# Patient Record
Sex: FEMALE | Race: WHITE | HISPANIC OR LATINO | Employment: PART TIME | URBAN - METROPOLITAN AREA
[De-identification: names, ages, dates, MRNs, and addresses within clinical notes are randomized per-mention and may not be internally consistent; named-entity substitution may affect disease eponyms.]

---

## 2017-08-11 ENCOUNTER — HOSPITAL ENCOUNTER (OUTPATIENT)
Dept: RADIOLOGY | Facility: HOSPITAL | Age: 47
Discharge: HOME | End: 2017-08-11
Payer: MEDICARE

## 2017-08-11 ENCOUNTER — TRANSCRIBE ORDERS (OUTPATIENT)
Dept: ADMINISTRATIVE | Facility: HOSPITAL | Age: 47
End: 2017-08-11

## 2017-08-11 ENCOUNTER — APPOINTMENT (OUTPATIENT)
Dept: LAB | Facility: HOSPITAL | Age: 47
End: 2017-08-11
Payer: MEDICARE

## 2017-08-11 ENCOUNTER — OFFICE VISIT (OUTPATIENT)
Dept: LAB | Facility: HOSPITAL | Age: 47
End: 2017-08-11
Payer: MEDICARE

## 2017-08-11 DIAGNOSIS — Z01.812 PRE-OPERATIVE LABORATORY EXAMINATION: Primary | ICD-10-CM

## 2017-08-11 DIAGNOSIS — Z01.812 PRE-OPERATIVE LABORATORY EXAMINATION: ICD-10-CM

## 2017-08-11 DIAGNOSIS — Z01.818 PREOP EXAMINATION: ICD-10-CM

## 2017-08-11 LAB
ALBUMIN SERPL BCP-MCNC: 3.9 G/DL (ref 3.5–5)
ALP SERPL-CCNC: 95 U/L (ref 46–116)
ALT SERPL W P-5'-P-CCNC: 42 U/L (ref 12–78)
ANION GAP SERPL CALCULATED.3IONS-SCNC: 10 MMOL/L (ref 4–13)
AST SERPL W P-5'-P-CCNC: 26 U/L (ref 5–45)
ATRIAL RATE: 66 BPM
BASOPHILS # BLD AUTO: 0.02 THOUSANDS/ΜL (ref 0–0.1)
BASOPHILS NFR BLD AUTO: 0 % (ref 0–1)
BILIRUB SERPL-MCNC: 0.5 MG/DL (ref 0.2–1)
BUN SERPL-MCNC: 10 MG/DL (ref 5–25)
CALCIUM SERPL-MCNC: 9.2 MG/DL (ref 8.3–10.1)
CHLORIDE SERPL-SCNC: 104 MMOL/L (ref 100–108)
CO2 SERPL-SCNC: 27 MMOL/L (ref 21–32)
CREAT SERPL-MCNC: 0.88 MG/DL (ref 0.6–1.3)
EOSINOPHIL # BLD AUTO: 0.15 THOUSAND/ΜL (ref 0–0.61)
EOSINOPHIL NFR BLD AUTO: 3 % (ref 0–6)
ERYTHROCYTE [DISTWIDTH] IN BLOOD BY AUTOMATED COUNT: 12.7 % (ref 11.6–15.1)
GFR SERPL CREATININE-BSD FRML MDRD: 77 ML/MIN/1.73SQ M
GLUCOSE P FAST SERPL-MCNC: 93 MG/DL (ref 65–99)
HCT VFR BLD AUTO: 40.4 % (ref 34.8–46.1)
HGB BLD-MCNC: 13.4 G/DL (ref 11.5–15.4)
LYMPHOCYTES # BLD AUTO: 1.69 THOUSANDS/ΜL (ref 0.6–4.47)
LYMPHOCYTES NFR BLD AUTO: 31 % (ref 14–44)
MCH RBC QN AUTO: 31.5 PG (ref 26.8–34.3)
MCHC RBC AUTO-ENTMCNC: 33.2 G/DL (ref 31.4–37.4)
MCV RBC AUTO: 95 FL (ref 82–98)
MONOCYTES # BLD AUTO: 0.51 THOUSAND/ΜL (ref 0.17–1.22)
MONOCYTES NFR BLD AUTO: 9 % (ref 4–12)
NEUTROPHILS # BLD AUTO: 3.12 THOUSANDS/ΜL (ref 1.85–7.62)
NEUTS SEG NFR BLD AUTO: 57 % (ref 43–75)
NRBC BLD AUTO-RTO: 0 /100 WBCS
P AXIS: 56 DEGREES
PLATELET # BLD AUTO: 263 THOUSANDS/UL (ref 149–390)
PMV BLD AUTO: 9.7 FL (ref 8.9–12.7)
POTASSIUM SERPL-SCNC: 3.8 MMOL/L (ref 3.5–5.3)
PR INTERVAL: 128 MS
PROT SERPL-MCNC: 7.7 G/DL (ref 6.4–8.2)
QRS AXIS: 20 DEGREES
QRSD INTERVAL: 82 MS
QT INTERVAL: 430 MS
QTC INTERVAL: 450 MS
RBC # BLD AUTO: 4.25 MILLION/UL (ref 3.81–5.12)
SODIUM SERPL-SCNC: 141 MMOL/L (ref 136–145)
T WAVE AXIS: 17 DEGREES
VENTRICULAR RATE: 66 BPM
WBC # BLD AUTO: 5.5 THOUSAND/UL (ref 4.31–10.16)

## 2017-08-11 PROCEDURE — 36415 COLL VENOUS BLD VENIPUNCTURE: CPT

## 2017-08-11 PROCEDURE — 85025 COMPLETE CBC W/AUTO DIFF WBC: CPT

## 2017-08-11 PROCEDURE — 71020 HB CHEST X-RAY 2VW FRONTAL&LATL: CPT

## 2017-08-11 PROCEDURE — 80053 COMPREHEN METABOLIC PANEL: CPT

## 2017-08-11 PROCEDURE — 93005 ELECTROCARDIOGRAM TRACING: CPT

## 2017-11-02 ENCOUNTER — HOSPITAL ENCOUNTER (EMERGENCY)
Facility: HOSPITAL | Age: 47
Discharge: HOME | End: 2017-11-02
Attending: EMERGENCY MEDICINE | Admitting: EMERGENCY MEDICINE
Payer: MEDICARE

## 2017-11-02 VITALS
SYSTOLIC BLOOD PRESSURE: 119 MMHG | BODY MASS INDEX: 35.87 KG/M2 | HEART RATE: 73 BPM | TEMPERATURE: 98.2 F | DIASTOLIC BLOOD PRESSURE: 69 MMHG | OXYGEN SATURATION: 96 % | WEIGHT: 190 LBS | RESPIRATION RATE: 18 BRPM | HEIGHT: 61 IN

## 2017-11-02 DIAGNOSIS — H10.9 CONJUNCTIVITIS: Primary | ICD-10-CM

## 2017-11-02 PROCEDURE — 99282 EMERGENCY DEPT VISIT SF MDM: CPT

## 2017-11-02 RX ORDER — GENTAMICIN SULFATE 3 MG/ML
1 SOLUTION/ DROPS OPHTHALMIC ONCE
Status: COMPLETED | OUTPATIENT
Start: 2017-11-02 | End: 2017-11-02

## 2017-11-02 RX ADMIN — GENTAMICIN SULFATE 1 DROP: 3 SOLUTION/ DROPS OPHTHALMIC at 22:08

## 2017-11-03 NOTE — ED PROVIDER NOTES
History  Chief Complaint   Patient presents with    Eye Redness     left eye redness and drainage started yesterday - pt's grandkids have pink eye     HPI  Patient is a 40-year-old female, apparently her grand kids have pinkeye  Patient reports some left eye redness and drainage today, patient reports her symptoms started yesterday  She reports no visual change  She was concerned about developing a worsening eye infection  Patient is here primarily to get topical antibiotics  She denies any fever or chills  She denies any other complaints  Past medical history of appendectomy, gastric sleeve,  Family history noncontributory  Social history, grandchildren have pinkeye, nonsmoker  None       History reviewed  No pertinent past medical history  Past Surgical History:   Procedure Laterality Date    APPENDECTOMY      CHOLECYSTECTOMY      OTHER SURGICAL HISTORY      gastric sleeve       History reviewed  No pertinent family history  I have reviewed and agree with the history as documented  Social History   Substance Use Topics    Smoking status: Never Smoker    Smokeless tobacco: Never Used    Alcohol use No        Review of Systems   Constitutional: Negative for fever  HENT: Negative for congestion  Eyes: Positive for redness  Negative for photophobia and pain  Respiratory: Negative for cough and shortness of breath  Cardiovascular: Negative for chest pain  Gastrointestinal: Negative for abdominal pain and vomiting         Physical Exam  ED Triage Vitals [11/02/17 2127]   Temperature Pulse Respirations Blood Pressure SpO2   98 2 °F (36 8 °C) 73 18 119/69 96 %      Temp Source Heart Rate Source Patient Position - Orthostatic VS BP Location FiO2 (%)   Temporal Monitor Sitting Left arm --      Pain Score       No Pain           Orthostatic Vital Signs  Vitals:    11/02/17 2127   BP: 119/69   Pulse: 73   Patient Position - Orthostatic VS: Sitting       Physical Exam   Constitutional: She is oriented to person, place, and time  She appears well-developed and well-nourished  HENT:   Head: Normocephalic  Right Ear: External ear normal    Left Ear: External ear normal    Nose: Nose normal    Mouth/Throat: Oropharynx is clear and moist    Eyes: EOM and lids are normal  Pupils are equal, round, and reactive to light  Right eye exhibits discharge  Bilateral conjunctival injection left greater than right, clear anterior chamber, normal cornea, normal vision   Neck: Normal range of motion  Neck supple  Pulmonary/Chest: Effort normal  No respiratory distress  Musculoskeletal: Normal range of motion  She exhibits no deformity  Neurological: She is alert and oriented to person, place, and time  Skin: Skin is warm and dry  Psychiatric: She has a normal mood and affect  Nursing note and vitals reviewed  ED Medications  Medications   gentamicin (GARAMYCIN) 0 3 % ophthalmic solution 1 drop (1 drop Both Eyes Given 11/2/17 2203)       Diagnostic Studies  Results Reviewed     None                 No orders to display              Procedures  Procedures       Phone Contacts  ED Phone Contact    ED Course  ED Course                                MDM medical decision making 49-year-old female, family has conjunctivitis, requesting antibiotics for pinkeye, exam consistent with conjunctivitis, discussed treatment and follow-up  CritCare Time    Disposition  Final diagnoses:   Conjunctivitis     Time reflects when diagnosis was documented in both MDM as applicable and the Disposition within this note     Time User Action Codes Description Comment    11/2/2017  9:51 PM Cezar Gastelum Add [H10 9] Conjunctivitis       ED Disposition     ED Disposition Condition Comment    Discharge  Antoinette León discharge to home/self care      Condition at discharge: Good        Follow-up Information     Follow up With Specialties Details Why Contact Info    Deangelo Rolon MD    [place] [id number]  [phone]          There are no discharge medications for this patient  No discharge procedures on file      ED Provider  Electronically Signed by           Kareem Bridges MD  11/02/17 0895

## 2017-11-03 NOTE — DISCHARGE INSTRUCTIONS
Gentamicin eye drops every 4 hours while awake  Good hand washing  Follow up with your doctor     Conjunctivitis   WHAT YOU SHOULD KNOW:   Conjunctivitis, or pink eye, is inflammation of your conjunctiva  The conjunctiva is a thin tissue that covers the front of your eye and the back of your eyelids  The conjunctiva helps protect your eye and keep it moist         INSTRUCTIONS:   Medicines:   · Allergy medicine: This medicine helps decrease itchy, red, swollen eyes caused by allergies  It may be given as a pill, eye drops, or nasal spray  · Antibiotics:  You will need antibiotics if your conjunctivitis is caused by bacteria  This medicine may be given as eye drops or eye ointment  · Steroid medicine: This medicine helps decrease inflammation  It may be given as a pill, eye drops, or nasal spray  · Take your medicine as directed  Call your healthcare provider if you think your medicine is not helping or if you have side effects  Tell him if you are allergic to any medicine  Keep a list of the medicines, vitamins, and herbs you take  Include the amounts, and when and why you take them  Bring the list or the pill bottles to follow-up visits  Carry your medicine list with you in case of an emergency  Follow up with your primary healthcare provider as directed: You may need to return for more tests on your eyes  These will help your primary healthcare provider check for eye damage  Write down your questions so you remember to ask them during your visits  Avoid the spread of conjunctivitis:   · Wash your hands often:  Wash your hands before you touch your eyes  Also wash your hands before you prepare or eat food and after you use the bathroom or change a diaper  · Avoid allergens:  Try to avoid the things that cause your allergies, such as pets, dust, or grass  · Avoid contact:  Do not share towels or washcloths  Try to stay away from others as much as possible   Ask when you can return to work or school  · Throw away eye makeup:  Throw away mascara and other eye makeup  Manage your symptoms:  · Apply a cool compress:  Wet a washcloth with cold water and place it on your eye  This will help decrease swelling  · Use eye drops:  Eye drops, or artificial tears, can be bought without a doctor's order  They help keep your eye moist     · Do not wear contact lenses: They can irritate your eye  Throw away the pair you are using and ask when you can wear them again  Use a new pair of lenses when your primary healthcare provider says it is okay  · Flush your eye:  You may need to flush your eye with saline to help decrease your symptoms  Ask for more information on how to flush your eye  Contact your primary healthcare provider if:   · Your eyesight becomes blurry  · You have tiny bumps or spots of blood on your eye  · You have questions or concerns about your condition or care  Return to the emergency department if:   · The swelling in your eye gets worse, even after treatment  · Your vision suddenly becomes worse or you cannot see at all  · Your eye begins to bleed  © 2014 [address] is for End User's use only and may not be sold, redistributed or otherwise used for commercial purposes  All illustrations and images included in CareNotes® are the copyrighted property of [employer]  or [employer]qu[employer]  The above information is an  only  It is not intended as medical advice for individual conditions or treatments  Talk to your doctor, nurse or pharmacist before following any medical regimen to see if it is safe and effective for you

## 2020-09-01 ENCOUNTER — HOSPITAL ENCOUNTER (EMERGENCY)
Facility: HOSPITAL | Age: 50
Discharge: HOME | End: 2020-09-01
Attending: EMERGENCY MEDICINE | Admitting: EMERGENCY MEDICINE
Payer: MEDICARE

## 2020-09-01 VITALS
SYSTOLIC BLOOD PRESSURE: 139 MMHG | DIASTOLIC BLOOD PRESSURE: 74 MMHG | HEIGHT: 61 IN | OXYGEN SATURATION: 100 % | WEIGHT: 164.9 LBS | RESPIRATION RATE: 16 BRPM | BODY MASS INDEX: 31.13 KG/M2 | HEART RATE: 79 BPM | TEMPERATURE: 98.8 F

## 2020-09-01 DIAGNOSIS — K04.7 DENTAL ABSCESS: Primary | ICD-10-CM

## 2020-09-01 PROCEDURE — 99284 EMERGENCY DEPT VISIT MOD MDM: CPT | Performed by: NURSE PRACTITIONER

## 2020-09-01 PROCEDURE — 99283 EMERGENCY DEPT VISIT LOW MDM: CPT

## 2020-09-01 RX ORDER — AMOXICILLIN 500 MG/1
500 CAPSULE ORAL 4 TIMES DAILY
Qty: 40 CAPSULE | Refills: 0 | Status: SHIPPED | OUTPATIENT
Start: 2020-09-01 | End: 2020-09-11

## 2020-09-01 RX ORDER — IBUPROFEN 600 MG/1
600 TABLET ORAL EVERY 6 HOURS PRN
Qty: 30 TABLET | Refills: 0 | Status: SHIPPED | OUTPATIENT
Start: 2020-09-01

## 2020-09-01 NOTE — ED PROVIDER NOTES
History  Chief Complaint   Patient presents with    Facial Swelling     pt c/o right sided facial swelling when she woke up this morning  states that she has been having an issue with her gums on that side     This is a 59-year-old female who presents here with a chief complaint of right lower facial swelling  States that she woke up this way  Believes that she has a dental abscess which is likely stemming from tooth number 28  No facial cellulitis  Nothing that would suggest Jose's angina           None       History reviewed  No pertinent past medical history  Past Surgical History:   Procedure Laterality Date    APPENDECTOMY      CHOLECYSTECTOMY      OTHER SURGICAL HISTORY      gastric sleeve    SLEEVE GASTROPLASTY         History reviewed  No pertinent family history  I have reviewed and agree with the history as documented  E-Cigarette/Vaping    E-Cigarette Use Never User      E-Cigarette/Vaping Substances     Social History     Tobacco Use    Smoking status: Never Smoker    Smokeless tobacco: Never Used   Substance Use Topics    Alcohol use: Yes     Frequency: 2-4 times a month     Drinks per session: 5 or 6     Binge frequency: Never    Drug use: No       Review of Systems   Constitutional: Negative for diaphoresis, fatigue and fever  HENT: Positive for dental problem and facial swelling  Negative for congestion, ear pain, nosebleeds and sore throat  Eyes: Negative for photophobia, pain, discharge and visual disturbance  Respiratory: Negative for cough, choking, chest tightness, shortness of breath and wheezing  Cardiovascular: Negative for chest pain and palpitations  Gastrointestinal: Negative for abdominal distention, abdominal pain, diarrhea and vomiting  Genitourinary: Negative for dysuria, flank pain and frequency  Musculoskeletal: Negative for back pain, gait problem and joint swelling  Skin: Negative for color change and rash     Neurological: Negative for dizziness, syncope and headaches  Psychiatric/Behavioral: Negative for behavioral problems and confusion  The patient is not nervous/anxious  All other systems reviewed and are negative  Physical Exam  Physical Exam  Vitals signs and nursing note reviewed  Constitutional:       General: She is not in acute distress  Appearance: She is well-developed  She is not ill-appearing or toxic-appearing  HENT:      Head: Normocephalic and atraumatic  Mouth/Throat:      Dentition: Dental caries and dental abscesses present  Eyes:      General:         Right eye: No discharge  Left eye: No discharge  Neck:      Musculoskeletal: Normal range of motion and neck supple  Cardiovascular:      Rate and Rhythm: Normal rate and regular rhythm  Pulmonary:      Effort: Pulmonary effort is normal  No accessory muscle usage or respiratory distress  Abdominal:      General: There is no distension  Tenderness: There is no guarding  Musculoskeletal: Normal range of motion  Skin:     General: Skin is warm and dry  Neurological:      Mental Status: She is alert and oriented to person, place, and time  Coordination: Coordination normal    Psychiatric:         Behavior: Behavior is cooperative  Vital Signs  ED Triage Vitals [09/01/20 0739]   Temperature Pulse Respirations Blood Pressure SpO2   98 8 °F (37 1 °C) 79 16 139/74 100 %      Temp Source Heart Rate Source Patient Position - Orthostatic VS BP Location FiO2 (%)   Oral Monitor Sitting Right arm --      Pain Score       --           Vitals:    09/01/20 0739   BP: 139/74   Pulse: 79   Patient Position - Orthostatic VS: Sitting         Visual Acuity      ED Medications  Medications - No data to display    Diagnostic Studies  Results Reviewed     None                 No orders to display              Procedures  Procedures         ED Course       US AUDIT      Most Recent Value   Initial Alcohol Screen: US AUDIT-C    1   How often do you have a drink containing alcohol? 3 Filed at: 09/01/2020 0740   2  How many drinks containing alcohol do you have on a typical day you are drinking? 4 Filed at: 09/01/2020 0740   3b  FEMALE Any Age, or MALE 65+: How often do you have 4 or more drinks on one occassion? 0 Filed at: 09/01/2020 0740   Audit-C Score  7 Filed at: 09/01/2020 0740   Full Alcohol Screen: US AUDIT   4  How often during the last year have you found that you were not able to stop drinking once you had started? 0 Filed at: 09/01/2020 0740   5  How often during past year have you failed to do what was normally expected of you because of drinking? 0 Filed at: 09/01/2020 0740   6  How often in past year have you needed a first drink in the morning to get yourself going after a heavy drinking session? 0 Filed at: 09/01/2020 0740   7  How often in past year have you had feeling of guilt or remorse after drinking? 0 Filed at: 09/01/2020 0740   8  How often in past year have you been unable to remember what happened night before because you had been drinking? 0 Filed at: 09/01/2020 0740   9  Have you or someone else been injured as a result of your drinking? 0 Filed at: 09/01/2020 0740   10  Has a relative, friend, doctor or other health worker been concerned about your drinking and suggested you cut down?   0 Filed at: 09/01/2020 0740   AUDIT Total Score  7 Filed at: 09/01/2020 0740                  CALLIE/DAST-10      Most Recent Value   How many times in the past year have you    Used an illegal drug or used a prescription medication for non-medical reasons?   Never Filed at: 09/01/2020 0741                                MDM  Number of Diagnoses or Management Options  Dental abscess: new and requires workup     Amount and/or Complexity of Data Reviewed  Independent visualization of images, tracings, or specimens: yes    Patient Progress  Patient progress: stable        Disposition  Final diagnoses:   Dental abscess Time reflects when diagnosis was documented in both MDM as applicable and the Disposition within this note     Time User Action Codes Description Comment    9/1/2020  8:43 AM Alinda Peggy Add [K04 7] Dental abscess       ED Disposition     ED Disposition Condition Date/Time Comment    Discharge Stable Tue Sep 1, 2020  8:43 AM Linus Chip discharge to home/self care  Follow-up Information     Follow up With Specialties Details Why Contact Info    [facility]  Call  For Continued Evaluation [phone]    [address] [place] [address], [address]     [address] [facility], [address] [place] [address]          Patient's Medications   Discharge Prescriptions    AMOXICILLIN (AMOXIL) 500 MG CAPSULE    Take 1 capsule (500 mg total) by mouth 4 (four) times a day for 10 days       Start Date: 9/1/2020  End Date: 9/11/2020       Order Dose: 500 mg       Quantity: 40 capsule    Refills: 0    IBUPROFEN (MOTRIN) 600 MG TABLET    Take 1 tablet (600 mg total) by mouth every 6 (six) hours as needed for mild pain for up to 30 doses       Start Date: 9/1/2020  End Date: --       Order Dose: 600 mg       Quantity: 30 tablet    Refills: 0     No discharge procedures on file      PDMP Review     None          ED Provider  Electronically Signed by           LORENZA Zuniga  09/01/20 8073

## 2021-12-17 ENCOUNTER — HOSPITAL ENCOUNTER (EMERGENCY)
Facility: HOSPITAL | Age: 51
Discharge: HOME | End: 2021-12-17
Attending: EMERGENCY MEDICINE
Payer: MEDICARE

## 2021-12-17 VITALS
OXYGEN SATURATION: 100 % | DIASTOLIC BLOOD PRESSURE: 76 MMHG | SYSTOLIC BLOOD PRESSURE: 131 MMHG | RESPIRATION RATE: 18 BRPM | HEART RATE: 64 BPM | TEMPERATURE: 97.5 F

## 2021-12-17 DIAGNOSIS — K08.89 DENTALGIA: Primary | ICD-10-CM

## 2021-12-17 PROCEDURE — 99282 EMERGENCY DEPT VISIT SF MDM: CPT

## 2021-12-17 PROCEDURE — 99284 EMERGENCY DEPT VISIT MOD MDM: CPT | Performed by: PHYSICIAN ASSISTANT

## 2021-12-17 RX ORDER — AMOXICILLIN AND CLAVULANATE POTASSIUM 875; 125 MG/1; MG/1
1 TABLET, FILM COATED ORAL EVERY 12 HOURS
Qty: 14 TABLET | Refills: 0 | Status: SHIPPED | OUTPATIENT
Start: 2021-12-17 | End: 2021-12-24

## 2021-12-17 RX ORDER — AMOXICILLIN AND CLAVULANATE POTASSIUM 875; 125 MG/1; MG/1
1 TABLET, FILM COATED ORAL ONCE
Status: COMPLETED | OUTPATIENT
Start: 2021-12-17 | End: 2021-12-17

## 2021-12-17 RX ORDER — ACETAMINOPHEN 325 MG/1
975 TABLET ORAL ONCE
Status: COMPLETED | OUTPATIENT
Start: 2021-12-17 | End: 2021-12-17

## 2021-12-17 RX ADMIN — ACETAMINOPHEN 975 MG: 325 TABLET, FILM COATED ORAL at 17:53

## 2021-12-17 RX ADMIN — AMOXICILLIN AND CLAVULANATE POTASSIUM 1 TABLET: 875; 125 TABLET, FILM COATED ORAL at 17:53

## 2023-03-08 ENCOUNTER — HOSPITAL ENCOUNTER (EMERGENCY)
Facility: HOSPITAL | Age: 53
Discharge: HOME | End: 2023-03-08
Attending: EMERGENCY MEDICINE | Admitting: EMERGENCY MEDICINE

## 2023-03-08 ENCOUNTER — APPOINTMENT (EMERGENCY)
Dept: CT IMAGING | Facility: HOSPITAL | Age: 53
End: 2023-03-08

## 2023-03-08 VITALS
HEART RATE: 75 BPM | OXYGEN SATURATION: 99 % | RESPIRATION RATE: 18 BRPM | DIASTOLIC BLOOD PRESSURE: 70 MMHG | SYSTOLIC BLOOD PRESSURE: 122 MMHG | TEMPERATURE: 98.2 F

## 2023-03-08 DIAGNOSIS — K04.7 PERIAPICAL ABSCESS WITH FACIAL INVOLVEMENT: ICD-10-CM

## 2023-03-08 DIAGNOSIS — K08.89 TOOTHACHE: ICD-10-CM

## 2023-03-08 DIAGNOSIS — L03.211 FACIAL CELLULITIS: Primary | ICD-10-CM

## 2023-03-08 LAB
ANION GAP SERPL CALCULATED.3IONS-SCNC: 8 MMOL/L (ref 4–13)
BASOPHILS # BLD AUTO: 0.03 THOUSANDS/ÂΜL (ref 0–0.1)
BASOPHILS NFR BLD AUTO: 0 % (ref 0–1)
BUN SERPL-MCNC: 9 MG/DL (ref 5–25)
CALCIUM SERPL-MCNC: 9.6 MG/DL (ref 8.4–10.2)
CHLORIDE SERPL-SCNC: 105 MMOL/L (ref 96–108)
CO2 SERPL-SCNC: 27 MMOL/L (ref 21–32)
CREAT SERPL-MCNC: 0.81 MG/DL (ref 0.6–1.3)
EOSINOPHIL # BLD AUTO: 0.11 THOUSAND/ÂΜL (ref 0–0.61)
EOSINOPHIL NFR BLD AUTO: 2 % (ref 0–6)
ERYTHROCYTE [DISTWIDTH] IN BLOOD BY AUTOMATED COUNT: 12.4 % (ref 11.6–15.1)
GFR SERPL CREATININE-BSD FRML MDRD: 82 ML/MIN/1.73SQ M
GLUCOSE SERPL-MCNC: 109 MG/DL (ref 65–140)
HCT VFR BLD AUTO: 41.2 % (ref 34.8–46.1)
HGB BLD-MCNC: 13.9 G/DL (ref 11.5–15.4)
IMM GRANULOCYTES # BLD AUTO: 0.02 THOUSAND/UL (ref 0–0.2)
IMM GRANULOCYTES NFR BLD AUTO: 0 % (ref 0–2)
LYMPHOCYTES # BLD AUTO: 0.9 THOUSANDS/ÂΜL (ref 0.6–4.47)
LYMPHOCYTES NFR BLD AUTO: 12 % (ref 14–44)
MCH RBC QN AUTO: 33.6 PG (ref 26.8–34.3)
MCHC RBC AUTO-ENTMCNC: 33.7 G/DL (ref 31.4–37.4)
MCV RBC AUTO: 100 FL (ref 82–98)
MONOCYTES # BLD AUTO: 0.82 THOUSAND/ÂΜL (ref 0.17–1.22)
MONOCYTES NFR BLD AUTO: 11 % (ref 4–12)
NEUTROPHILS # BLD AUTO: 5.41 THOUSANDS/ÂΜL (ref 1.85–7.62)
NEUTS SEG NFR BLD AUTO: 75 % (ref 43–75)
NRBC BLD AUTO-RTO: 0 /100 WBCS
PLATELET # BLD AUTO: 255 THOUSANDS/UL (ref 149–390)
PMV BLD AUTO: 9.3 FL (ref 8.9–12.7)
POTASSIUM SERPL-SCNC: 4.1 MMOL/L (ref 3.5–5.3)
RBC # BLD AUTO: 4.14 MILLION/UL (ref 3.81–5.12)
SODIUM SERPL-SCNC: 140 MMOL/L (ref 135–147)
WBC # BLD AUTO: 7.29 THOUSAND/UL (ref 4.31–10.16)

## 2023-03-08 RX ORDER — ACETAMINOPHEN 325 MG/1
650 TABLET ORAL ONCE
Status: COMPLETED | OUTPATIENT
Start: 2023-03-08 | End: 2023-03-08

## 2023-03-08 RX ORDER — MORPHINE SULFATE 15 MG/1
7.5 TABLET ORAL EVERY 4 HOURS PRN
Qty: 10 TABLET | Refills: 0 | Status: SHIPPED | OUTPATIENT
Start: 2023-03-08

## 2023-03-08 RX ORDER — CLINDAMYCIN HYDROCHLORIDE 150 MG/1
450 CAPSULE ORAL EVERY 8 HOURS SCHEDULED
Qty: 63 CAPSULE | Refills: 0 | Status: SHIPPED | OUTPATIENT
Start: 2023-03-08 | End: 2023-03-15

## 2023-03-08 RX ORDER — CLINDAMYCIN PHOSPHATE 600 MG/50ML
600 INJECTION INTRAVENOUS ONCE
Status: DISCONTINUED | OUTPATIENT
Start: 2023-03-08 | End: 2023-03-08

## 2023-03-08 RX ORDER — KETOROLAC TROMETHAMINE 30 MG/ML
15 INJECTION, SOLUTION INTRAMUSCULAR; INTRAVENOUS ONCE
Status: COMPLETED | OUTPATIENT
Start: 2023-03-08 | End: 2023-03-08

## 2023-03-08 RX ORDER — CLINDAMYCIN HYDROCHLORIDE 150 MG/1
450 CAPSULE ORAL ONCE
Status: COMPLETED | OUTPATIENT
Start: 2023-03-08 | End: 2023-03-08

## 2023-03-08 RX ORDER — NAPROXEN 500 MG/1
500 TABLET ORAL EVERY 12 HOURS PRN
Qty: 20 TABLET | Refills: 0 | Status: SHIPPED | OUTPATIENT
Start: 2023-03-08

## 2023-03-08 RX ADMIN — CLINDAMYCIN HYDROCHLORIDE 450 MG: 150 CAPSULE ORAL at 10:06

## 2023-03-08 RX ADMIN — ACETAMINOPHEN 650 MG: 325 TABLET, FILM COATED ORAL at 07:48

## 2023-03-08 RX ADMIN — KETOROLAC TROMETHAMINE 15 MG: 30 INJECTION, SOLUTION INTRAMUSCULAR at 07:50

## 2023-03-08 RX ADMIN — IOHEXOL 85 ML: 350 INJECTION, SOLUTION INTRAVENOUS at 08:17

## 2023-03-08 NOTE — ED PROVIDER NOTES
History  Chief Complaint   Patient presents with   • Dental Pain     Patient c/o right lower dental pain for a few months  Patient waiting for appt with [facility]     Patient is a 57-year-old female with past medical history of hypothyroidism, prior cholecystectomy, appendectomy, Oestreich sleeve, presents to the emergency department for several months of right lower dental pain  She states that her tooth is broken and has caused her pain for a while however since Monday the pain is gotten worse  She has been taking leftover amoxicillin 500 mg since the pain started on Monday and she is not getting relief  She has also been taking ibuprofen 600 mg which initially was helping but since yesterday it is not helped  She has seen a dentist who referred her to an oral surgeon and she needs to have multiple teeth pulled over she states she is on a waiting list to see [facility]  She reports her right lower face is painful and starting to swell  She denies any fevers  She denies any significant headache, visual disturbance, neck pain or stiffness, facial drooping, other focal neurologic deficits, difficulty swallowing or handling secretions, difficulty speaking, nausea, vomiting, diarrhea, chest pain, palpitations, dyspnea, abdominal pain, urinary symptoms, skin rash or color change  History provided by:  Patient   used: No    Dental Pain  Associated symptoms: facial swelling    Associated symptoms: no congestion, no drooling, no fever, no headaches and no neck pain        Prior to Admission Medications   Prescriptions Last Dose Informant Patient Reported?  Taking?   ibuprofen (MOTRIN) 600 mg tablet   No No   Sig: Take 1 tablet (600 mg total) by mouth every 6 (six) hours as needed for mild pain for up to 30 doses      Facility-Administered Medications: None       Past Medical History:   Diagnosis Date   • Disease of thyroid gland        Past Surgical History:   Procedure Laterality Date   • APPENDECTOMY • CHOLECYSTECTOMY     • OTHER SURGICAL HISTORY      gastric sleeve   • SLEEVE GASTROPLASTY         History reviewed  No pertinent family history  I have reviewed and agree with the history as documented  E-Cigarette/Vaping   • E-Cigarette Use Never User      E-Cigarette/Vaping Substances     Social History     Tobacco Use   • Smoking status: Never   • Smokeless tobacco: Never   Vaping Use   • Vaping Use: Never used   Substance Use Topics   • Alcohol use: Yes   • Drug use: No       Review of Systems   Constitutional: Negative for chills and fever  HENT: Positive for dental problem and facial swelling  Negative for congestion, drooling, ear pain, rhinorrhea, sore throat, trouble swallowing and voice change  Respiratory: Negative for cough, chest tightness, shortness of breath and wheezing  Cardiovascular: Negative for chest pain and palpitations  Gastrointestinal: Negative for abdominal pain, constipation, diarrhea, nausea and vomiting  Genitourinary: Negative for dysuria, flank pain, frequency and hematuria  Musculoskeletal: Negative for back pain and neck pain  Skin: Negative for color change, pallor, rash and wound  Allergic/Immunologic: Negative for immunocompromised state  Neurological: Negative for dizziness, syncope, weakness, light-headedness, numbness and headaches  Hematological: Negative for adenopathy  Psychiatric/Behavioral: Negative for confusion and decreased concentration  All other systems reviewed and are negative  Physical Exam  Physical Exam  Vitals and nursing note reviewed  Constitutional:       General: She is not in acute distress  Appearance: Normal appearance  She is well-developed  She is not ill-appearing, toxic-appearing or diaphoretic  HENT:      Head: Normocephalic and atraumatic        Right Ear: External ear normal       Left Ear: External ear normal       Nose: Nose normal       Mouth/Throat:      Comments: Right lower molar, third from the back, is partially broken and tender to touch  No obvious drainable abscess within the mouth  Mild trismus  Patient speaking and handling oral secretions without difficulty  Posterior oropharynx appears unremarkable  No evidence of Barbara's angina  There is tenderness and mild right lower facial swelling without overlying erythema or warmth  Eyes:      Extraocular Movements: Extraocular movements intact  Conjunctiva/sclera: Conjunctivae normal    Neck:      Vascular: No JVD  Comments: Bilateral anterior cervical and submandibular lymphadenopathy  Cardiovascular:      Rate and Rhythm: Normal rate and regular rhythm  Pulses: Normal pulses  Heart sounds: Normal heart sounds  No murmur heard  No friction rub  No gallop  Pulmonary:      Effort: Pulmonary effort is normal  No respiratory distress  Breath sounds: Normal breath sounds  No wheezing, rhonchi or rales  Abdominal:      General: There is no distension  Palpations: Abdomen is soft  Tenderness: There is no abdominal tenderness  There is no guarding or rebound  Musculoskeletal:         General: No tenderness  Normal range of motion  Cervical back: Normal range of motion and neck supple  No rigidity or tenderness  Lymphadenopathy:      Cervical: Cervical adenopathy present  Skin:     General: Skin is warm and dry  Coloration: Skin is not pale  Findings: No erythema or rash  Neurological:      General: No focal deficit present  Mental Status: She is alert and oriented to person, place, and time  Sensory: No sensory deficit  Motor: No weakness     Psychiatric:         Mood and Affect: Mood normal          Behavior: Behavior normal          Vital Signs  ED Triage Vitals   Temperature Pulse Respirations Blood Pressure SpO2   03/08/23 0718 03/08/23 0718 03/08/23 0718 03/08/23 0718 03/08/23 0718   98 2 °F (36 8 °C) 85 17 155/90 100 %      Temp Source Heart Rate Source Patient Position - Orthostatic VS BP Location FiO2 (%)   03/08/23 0718 03/08/23 0718 03/08/23 0718 03/08/23 0718 --   Oral Monitor Sitting Left arm       Pain Score       03/08/23 0748       10 - Worst Possible Pain         Vitals:    03/08/23 0718 03/08/23 0900 03/08/23 1000   BP: 155/90 125/75 122/70   BP Location: Left arm     Pulse: 85 70 75   Resp: 17 18 18   Temp: 98 2 °F (36 8 °C)     TempSrc: Oral     SpO2: 100% 100% 99%       Visual Acuity      ED Medications  Medications   acetaminophen (TYLENOL) tablet 650 mg (650 mg Oral Given 3/8/23 0748)   ketorolac (TORADOL) injection 15 mg (15 mg Intravenous Given 3/8/23 0750)   iohexol (OMNIPAQUE) 350 MG/ML injection (SINGLE-DOSE) 85 mL (85 mL Intravenous Given 3/8/23 0817)   clindamycin (CLEOCIN) capsule 450 mg (450 mg Oral Given 3/8/23 1006)       Diagnostic Studies  Results Reviewed     Procedure Component Value Units Date/Time    Basic metabolic panel [[id number]] Collected: 03/08/23 0747    Lab Status: Final result Specimen: Blood from Arm, Right Updated: 03/08/23 0810     Sodium 140 mmol/L      Potassium 4 1 mmol/L      Chloride 105 mmol/L      CO2 27 mmol/L      ANION GAP 8 mmol/L      BUN 9 mg/dL      Creatinine 0 81 mg/dL      Glucose 109 mg/dL      Calcium 9 6 mg/dL      eGFR 82 ml/min/1 73sq m     Narrative:      Scotty guidelines for Chronic Kidney Disease (CKD):   •  Stage 1 with normal or high GFR (GFR > 90 mL/min/1 73 square meters)  •  Stage 2 Mild CKD (GFR = 60-89 mL/min/1 73 square meters)  •  Stage 3A Moderate CKD (GFR = 45-59 mL/min/1 73 square meters)  •  Stage 3B Moderate CKD (GFR = 30-44 mL/min/1 73 square meters)  •  Stage 4 Severe CKD (GFR = 15-29 mL/min/1 73 square meters)  •  Stage 5 End Stage CKD (GFR <15 mL/min/1 73 square meters)  Note: GFR calculation is accurate only with a steady state creatinine    CBC and differential [[id number]]  (Abnormal) Collected: 03/08/23 0747    Lab Status: Final result Specimen: Blood from Arm, Right Updated: 03/08/23 0755     WBC 7 29 Thousand/uL      RBC 4 14 Million/uL      Hemoglobin 13 9 g/dL      Hematocrit 41 2 %       fL      MCH 33 6 pg      MCHC 33 7 g/dL      RDW 12 4 %      MPV 9 3 fL      Platelets 968 Thousands/uL      nRBC 0 /100 WBCs      Neutrophils Relative 75 %      Immat GRANS % 0 %      Lymphocytes Relative 12 %      Monocytes Relative 11 %      Eosinophils Relative 2 %      Basophils Relative 0 %      Neutrophils Absolute 5 41 Thousands/µL      Immature Grans Absolute 0 02 Thousand/uL      Lymphocytes Absolute 0 90 Thousands/µL      Monocytes Absolute 0 82 Thousand/µL      Eosinophils Absolute 0 11 Thousand/µL      Basophils Absolute 0 03 Thousands/µL                  CT facial bones with contrast   Final Result by Porfirio Maki DO (03/08 8691)      Right facial cellulitis and myositis superficial to the right mandible, with no abscess identified  Multiple periapical lucencies or surrounding tooth roots within the maxillary and mandibular dentition  There is a fairly large cavity as well as a periapical lucency surrounding the tooth root of the 28th tooth, immediately below the above described    inflammation, suggesting dental origin of the above infection  Workstation performed: [id number]                    Procedures  Procedures         ED Course  ED Course as of 03/08/23 1447   Wed Mar 08, 2023   0759 WBC: 7 29   0937 Reviewed CT results and tiger texted on-call [facility], Dr Justina Menjivar  Will give a dose of IV clindamycin  Updated patient about results  [address] [place] with [facility] on call, Dr Justina Menjivar, who will have office call patient this week to be seen within the week  5554 Updated patient about plan to have [facility] office call her for prompt appointment  Will start patient on clindamycin and advised her to stop the amoxicillin  Send home prescription for Naprosyn as well as oral morphine for more severe pain    Discussed risks of narcotics including constipation, sedation and advised her not to drive while taking this  Discussed ED return parameters  SBIRT 22yo+    Flowsheet Row Most Recent Value   SBIRT (23 yo +)    In order to provide better care to our patients, we are screening all of our patients for alcohol and drug use  Would it be okay to ask you these screening questions? Yes Filed at: 03/08/2023 6381   Initial Alcohol Screen: US AUDIT-C     1  How often do you have a drink containing alcohol? 0 Filed at: 03/08/2023 0733   2  How many drinks containing alcohol do you have on a typical day you are drinking? 0 Filed at: 03/08/2023 0733   3a  Male UNDER 65: How often do you have five or more drinks on one occasion? 0 Filed at: 03/08/2023 0733   3b  FEMALE Any Age, or MALE 65+: How often do you have 4 or more drinks on one occassion? 0 Filed at: 03/08/2023 0733   Audit-C Score 0 Filed at: 03/08/2023 4380   CALLIE: How many times in the past year have you    Used an illegal drug or used a prescription medication for non-medical reasons? Never Filed at: 03/08/2023 0533                    Medical Decision Making  47-year-old female presents with several months of progressively worsening right lower dental pain  Patient is on a waiting list to see [facility]  She presents with worse pain, not improving with Amoxicillin as well as new facial swelling  Likely patient has dental abscess but given facial tenderness/swelling, will rule out facial abscess with CT facial bones with IV contrast  Will check CBC, BMP and provide toradol and tylenol for pain  Amount and/or Complexity of Data Reviewed  Labs: ordered  Decision-making details documented in ED Course  Radiology: ordered  Decision-making details documented in ED Course  Risk  OTC drugs  Prescription drug management            Disposition  Final diagnoses:   Facial cellulitis   Periapical abscess with facial involvement   Toothache     Time reflects when diagnosis was documented in both MDM as applicable and the Disposition within this note     Time User Action Codes Description Comment    3/8/2023  9:50 AM Evone Shallow E Add [L03 211] Facial cellulitis     3/8/2023  9:50 AM Evone Shallow E Add [K02 9] Pain due to dental caries     3/8/2023  9:51 AM Evone Shallow E Add [K04 7] Periapical abscess with facial involvement     3/8/2023  9:51 AM Evone Shallow E Remove [K02 9] Pain due to dental caries     3/8/2023  9:51 AM Rhae Him Add [K08 89] Toothache       ED Disposition     ED Disposition   Discharge    Condition   Stable    Date/Time   Wed Mar 8, 2023  9:50 AM    Comment   Sparkle Plascencia discharge to home/self care                 Follow-up Information     Follow up With Specialties Details Why Contact Info Additional [address]  for Oral and Maxillofacial Surgery [place]  Schedule an appointment as soon as possible for a visit   [address]     Ninfa Cea, DMD Oral Maxillofacial Surgery Schedule an appointment as soon as possible for a visit   [address] [facility] 16       5[address] [facility] Emergency Department Emergency Medicine Go to  If symptoms worsen [address] Emergency Department, [address] [place] [address], [phone]          Discharge Medication List as of 3/8/2023  9:54 AM      START taking these medications    Details   clindamycin (CLEOCIN) 150 mg capsule Take 3 capsules (450 mg total) by mouth every 8 (eight) hours for 7 days, Starting Wed 3/8/2023, Until Wed 3/15/2023, Normal      morphine (MSIR) 15 mg tablet Take 0 5 tablets (7 5 mg total) by mouth every 4 (four) hours as needed for severe pain Max Daily Amount: 45 mg, Starting Wed 3/8/2023, Normal      naproxen (NAPROSYN) 500 mg tablet Take 1 tablet (500 mg total) by mouth every 12 (twelve) hours as needed for mild pain or moderate pain, Starting Wed 3/8/2023, Normal         CONTINUE these medications which have NOT CHANGED    Details   ibuprofen (MOTRIN) 600 mg tablet Take 1 tablet (600 mg total) by mouth every 6 (six) hours as needed for mild pain for up to 30 doses, Starting Tue 9/1/2020, Print                 PDMP Review     None          ED Provider  Electronically Signed by           Howard Romero DO  03/08/23 0716

## 2023-03-08 NOTE — Clinical Note
Kimber Osei was seen and treated in our emergency department on 3/8/2023  No restrictions            Diagnosis:     Malvin Mars  may return to work on return date  She may return on this date: 03/10/2023         If you have any questions or concerns, please don't hesitate to call        Gerardo López DO    ______________________________           _______________          _______________  Hospital Representative                              Date                                Time

## 2023-12-13 ENCOUNTER — APPOINTMENT (EMERGENCY)
Dept: RADIOLOGY | Facility: HOSPITAL | Age: 53
End: 2023-12-13

## 2023-12-13 ENCOUNTER — HOSPITAL ENCOUNTER (EMERGENCY)
Facility: HOSPITAL | Age: 53
Discharge: HOME | End: 2023-12-13
Attending: STUDENT IN AN ORGANIZED HEALTH CARE EDUCATION/TRAINING PROGRAM

## 2023-12-13 VITALS
TEMPERATURE: 98.9 F | OXYGEN SATURATION: 100 % | RESPIRATION RATE: 18 BRPM | SYSTOLIC BLOOD PRESSURE: 169 MMHG | HEART RATE: 74 BPM | DIASTOLIC BLOOD PRESSURE: 81 MMHG

## 2023-12-13 DIAGNOSIS — M25.511 RIGHT SHOULDER PAIN: Primary | ICD-10-CM

## 2023-12-13 PROCEDURE — 96372 THER/PROPH/DIAG INJ SC/IM: CPT

## 2023-12-13 PROCEDURE — 73030 X-RAY EXAM OF SHOULDER: CPT

## 2023-12-13 PROCEDURE — 99284 EMERGENCY DEPT VISIT MOD MDM: CPT | Performed by: STUDENT IN AN ORGANIZED HEALTH CARE EDUCATION/TRAINING PROGRAM

## 2023-12-13 PROCEDURE — 99283 EMERGENCY DEPT VISIT LOW MDM: CPT

## 2023-12-13 RX ORDER — KETOROLAC TROMETHAMINE 30 MG/ML
30 INJECTION, SOLUTION INTRAMUSCULAR; INTRAVENOUS ONCE
Status: COMPLETED | OUTPATIENT
Start: 2023-12-13 | End: 2023-12-13

## 2023-12-13 RX ADMIN — KETOROLAC TROMETHAMINE 30 MG: 30 INJECTION, SOLUTION INTRAMUSCULAR at 17:46

## 2023-12-13 NOTE — DISCHARGE INSTRUCTIONS
Continues over-the-counter medications like Tylenol Motrin for discomfort. He can try ice or heat to the shoulder for 15-20 minutes at a time. Follow-up with your primary care physician for reevaluation. Orthopedics has been added to this chart. Return if you have new or worse symptoms such as numbness and tingling or weakness in your right extremity.

## 2023-12-13 NOTE — Clinical Note
Leo Mcginnis was seen and treated in our emergency department on 12/13/2023. Diagnosis:     Landy Carroll  . She may return on this date: 12/18/2023         If you have any questions or concerns, please don't hesitate to call.       Estella Salazar RN    ______________________________           _______________          _______________  Hospital Representative                              Date                                Time

## 2023-12-13 NOTE — Clinical Note
Maye Rishabh was seen and treated in our emergency department on 12/13/2023. Diagnosis: Rotator Cuff strain    Marianela Arnold  . She may return on this date: 12/15/2023         If you have any questions or concerns, please don't hesitate to call.       Meliton Vilchis RN    ______________________________           _______________          _______________  Hospital Representative                              Date                                Time

## 2023-12-13 NOTE — ED PROVIDER NOTES
History  Chief Complaint   Patient presents with    Arm Pain     Pt reports since Monday night she has pain that runs from her right shoulder to her hand. Reports its normally just the elbow that swells but now its the whole arm. HPI    Patient is a 57-year-old female present emerged department with right-sided shoulder pain. Patient says the pain has been going on for the last 3 days and got worse today. Patient noticed discomfort with movement. Pain is intermittent. Pain is in her shoulder at times radiates down her arm. Has used over-the-counter medication with some relief. No known history of trauma or injury. History includes hypothyroidism. Prior to Admission Medications   Prescriptions Last Dose Informant Patient Reported? Taking?   ibuprofen (MOTRIN) 600 mg tablet   No No   Sig: Take 1 tablet (600 mg total) by mouth every 6 (six) hours as needed for mild pain for up to 30 doses   morphine (MSIR) 15 mg tablet   No No   Sig: Take 0.5 tablets (7.5 mg total) by mouth every 4 (four) hours as needed for severe pain Max Daily Amount: 45 mg   naproxen (NAPROSYN) 500 mg tablet   No No   Sig: Take 1 tablet (500 mg total) by mouth every 12 (twelve) hours as needed for mild pain or moderate pain      Facility-Administered Medications: None       Past Medical History:   Diagnosis Date    Disease of thyroid gland        Past Surgical History:   Procedure Laterality Date    APPENDECTOMY      CHOLECYSTECTOMY      OTHER SURGICAL HISTORY      gastric sleeve    SLEEVE GASTROPLASTY         History reviewed. No pertinent family history. I have reviewed and agree with the history as documented.     E-Cigarette/Vaping    E-Cigarette Use Never User      E-Cigarette/Vaping Substances     Social History     Tobacco Use    Smoking status: Never    Smokeless tobacco: Never   Vaping Use    Vaping status: Never Used   Substance Use Topics    Alcohol use: Yes    Drug use: No       Review of Systems   Constitutional: Negative for chills and fever. HENT:  Negative for ear pain and sore throat. Eyes:  Negative for pain and visual disturbance. Respiratory:  Negative for cough and shortness of breath. Cardiovascular:  Negative for chest pain and palpitations. Gastrointestinal:  Negative for abdominal pain and vomiting. Genitourinary:  Negative for dysuria and hematuria. Musculoskeletal:  Positive for arthralgias. Negative for back pain. Skin:  Negative for color change and rash. Neurological:  Negative for seizures and syncope. All other systems reviewed and are negative. Physical Exam  Physical Exam  Vitals and nursing note reviewed. Constitutional:       General: She is not in acute distress. Appearance: She is well-developed. HENT:      Head: Normocephalic and atraumatic. Eyes:      Conjunctiva/sclera: Conjunctivae normal.   Cardiovascular:      Rate and Rhythm: Normal rate and regular rhythm. Heart sounds: No murmur heard. Pulmonary:      Effort: Pulmonary effort is normal. No respiratory distress. Breath sounds: Normal breath sounds. Abdominal:      Palpations: Abdomen is soft. Tenderness: There is no abdominal tenderness. Musculoskeletal:         General: Tenderness present. No swelling. Normal range of motion. Cervical back: Neck supple. Comments: Tenderness along the bicipital groove. Tenderness along the rotator cuff with range of motion. No numbness, tingling. Strength is equal with bilateral upper extremities. Skin:     General: Skin is warm and dry. Capillary Refill: Capillary refill takes less than 2 seconds. Neurological:      Mental Status: She is alert.    Psychiatric:         Mood and Affect: Mood normal.         Vital Signs  ED Triage Vitals   Temperature Pulse Respirations Blood Pressure SpO2   12/13/23 1622 12/13/23 1622 12/13/23 1622 12/13/23 1622 12/13/23 1622   98.9 °F (37.2 °C) 74 18 169/81 100 %      Temp Source Heart Rate Source Patient Position - Orthostatic VS BP Location FiO2 (%)   12/13/23 1622 12/13/23 1622 12/13/23 1622 12/13/23 1622 --   Temporal Monitor Sitting Left arm       Pain Score       12/13/23 1746       10 - Worst Possible Pain           Vitals:    12/13/23 1622   BP: 169/81   Pulse: 74   Patient Position - Orthostatic VS: Sitting         Visual Acuity      ED Medications  Medications   ketorolac (TORADOL) injection 30 mg (30 mg Intramuscular Given 12/13/23 1746)       Diagnostic Studies  Results Reviewed       None                   XR shoulder 2+ views RIGHT    (Results Pending)              Procedures  Procedures         ED Course                               SBIRT 20yo+      Flowsheet Row Most Recent Value   Initial Alcohol Screen: US AUDIT-C     1. How often do you have a drink containing alcohol? 3 Filed at: 12/13/2023 1748   2. How many drinks containing alcohol do you have on a typical day you are drinking? 2 Filed at: 12/13/2023 1748   3b. FEMALE Any Age, or MALE 65+: How often do you have 4 or more drinks on one occassion? 0 Filed at: 12/13/2023 1748   Audit-C Score 5 Filed at: 12/13/2023 8632   CALLIE: How many times in the past year have you. .. Used an illegal drug or used a prescription medication for non-medical reasons? Never Filed at: 12/13/2023 1748                      Medical Decision Making  Amount and/or Complexity of Data Reviewed  Radiology: ordered. Risk  Prescription drug management. Disposition  Final diagnoses:   Right shoulder pain     Time reflects when diagnosis was documented in both MDM as applicable and the Disposition within this note       Time User Action Codes Description Comment    12/13/2023  6:18 PM Arian Winston [place] Right shoulder pain           ED Disposition       ED Disposition   Discharge    Condition   Stable    Date/Time   Wed Dec 13, 2023 [address] discharge to home/self care.                    Follow-up Information Follow up With Specialties Details Why Contact Info Additional Information    Yris Dale MD Family Medicine Schedule an appointment as soon as possible for a visit  As needed [address]  [place] [address] [phone]       [address] [facility] Orthopedic Surgery Schedule an appointment as soon as possible for a visit   [phone] [facility], [address] [place] [address],[address], [phone]            Patient's Medications   Discharge Prescriptions    No medications on file       No discharge procedures on file.     PDMP Review       None            ED Provider  Electronically Signed by

## 2023-12-15 ENCOUNTER — TELEPHONE (OUTPATIENT)
Age: 53
End: 2023-12-15

## 2023-12-15 NOTE — TELEPHONE ENCOUNTER
Caller: Patient    Doctor: Orthopedics    Reason for call: Calling to find provider with orthopedics for shoulder. Will call back to schedule as needed.     Call back#: N/A

## 2025-02-17 ENCOUNTER — HOSPITAL ENCOUNTER (EMERGENCY)
Facility: HOSPITAL | Age: 55
Discharge: HOME | End: 2025-02-18
Attending: STUDENT IN AN ORGANIZED HEALTH CARE EDUCATION/TRAINING PROGRAM
Payer: COMMERCIAL

## 2025-02-17 ENCOUNTER — APPOINTMENT (EMERGENCY)
Dept: CT IMAGING | Facility: HOSPITAL | Age: 55
End: 2025-02-17
Payer: COMMERCIAL

## 2025-02-17 VITALS
WEIGHT: 130 LBS | BODY MASS INDEX: 24.55 KG/M2 | RESPIRATION RATE: 18 BRPM | DIASTOLIC BLOOD PRESSURE: 73 MMHG | OXYGEN SATURATION: 99 % | HEART RATE: 63 BPM | TEMPERATURE: 98 F | SYSTOLIC BLOOD PRESSURE: 123 MMHG | HEIGHT: 61 IN

## 2025-02-17 DIAGNOSIS — R10.9 ABDOMINAL PAIN: Primary | ICD-10-CM

## 2025-02-17 LAB
ALBUMIN SERPL BCG-MCNC: 4.4 G/DL (ref 3.5–5)
ALP SERPL-CCNC: 70 U/L (ref 34–104)
ALT SERPL W P-5'-P-CCNC: 22 U/L (ref 7–52)
ANION GAP SERPL CALCULATED.3IONS-SCNC: 9 MMOL/L (ref 4–13)
AST SERPL W P-5'-P-CCNC: 24 U/L (ref 13–39)
ATRIAL RATE: 71 BPM
BASOPHILS # BLD AUTO: 0.03 THOUSANDS/ΜL (ref 0–0.1)
BASOPHILS NFR BLD AUTO: 0 % (ref 0–1)
BILIRUB SERPL-MCNC: 0.74 MG/DL (ref 0.2–1)
BUN SERPL-MCNC: 14 MG/DL (ref 5–25)
CALCIUM SERPL-MCNC: 9.4 MG/DL (ref 8.4–10.2)
CARDIAC TROPONIN I PNL SERPL HS: <2 NG/L (ref ?–50)
CHLORIDE SERPL-SCNC: 107 MMOL/L (ref 96–108)
CO2 SERPL-SCNC: 22 MMOL/L (ref 21–32)
CREAT SERPL-MCNC: 0.81 MG/DL (ref 0.6–1.3)
EOSINOPHIL # BLD AUTO: 0.39 THOUSAND/ΜL (ref 0–0.61)
EOSINOPHIL NFR BLD AUTO: 5 % (ref 0–6)
ERYTHROCYTE [DISTWIDTH] IN BLOOD BY AUTOMATED COUNT: 12.7 % (ref 11.6–15.1)
GFR SERPL CREATININE-BSD FRML MDRD: 80 ML/MIN/1.73SQ M
GLUCOSE SERPL-MCNC: 92 MG/DL (ref 65–140)
HCT VFR BLD AUTO: 40.7 % (ref 34.8–46.1)
HGB BLD-MCNC: 13.6 G/DL (ref 11.5–15.4)
IMM GRANULOCYTES # BLD AUTO: 0.03 THOUSAND/UL (ref 0–0.2)
IMM GRANULOCYTES NFR BLD AUTO: 0 % (ref 0–2)
LIPASE SERPL-CCNC: 14 U/L (ref 11–82)
LYMPHOCYTES # BLD AUTO: 2.14 THOUSANDS/ΜL (ref 0.6–4.47)
LYMPHOCYTES NFR BLD AUTO: 27 % (ref 14–44)
MCH RBC QN AUTO: 32.9 PG (ref 26.8–34.3)
MCHC RBC AUTO-ENTMCNC: 33.4 G/DL (ref 31.4–37.4)
MCV RBC AUTO: 98 FL (ref 82–98)
MONOCYTES # BLD AUTO: 0.67 THOUSAND/ΜL (ref 0.17–1.22)
MONOCYTES NFR BLD AUTO: 9 % (ref 4–12)
NEUTROPHILS # BLD AUTO: 4.66 THOUSANDS/ΜL (ref 1.85–7.62)
NEUTS SEG NFR BLD AUTO: 59 % (ref 43–75)
NRBC BLD AUTO-RTO: 0 /100 WBCS
P AXIS: 81 DEGREES
PLATELET # BLD AUTO: 245 THOUSANDS/UL (ref 149–390)
PMV BLD AUTO: 9.5 FL (ref 8.9–12.7)
POTASSIUM SERPL-SCNC: 4.1 MMOL/L (ref 3.5–5.3)
PR INTERVAL: 138 MS
PROT SERPL-MCNC: 7 G/DL (ref 6.4–8.4)
QRS AXIS: 41 DEGREES
QRSD INTERVAL: 80 MS
QT INTERVAL: 412 MS
QTC INTERVAL: 447 MS
RBC # BLD AUTO: 4.14 MILLION/UL (ref 3.81–5.12)
SODIUM SERPL-SCNC: 138 MMOL/L (ref 135–147)
T WAVE AXIS: 53 DEGREES
VENTRICULAR RATE: 71 BPM
WBC # BLD AUTO: 7.92 THOUSAND/UL (ref 4.31–10.16)

## 2025-02-17 PROCEDURE — 93010 ELECTROCARDIOGRAM REPORT: CPT | Performed by: INTERNAL MEDICINE

## 2025-02-17 PROCEDURE — 96361 HYDRATE IV INFUSION ADD-ON: CPT

## 2025-02-17 PROCEDURE — 99284 EMERGENCY DEPT VISIT MOD MDM: CPT

## 2025-02-17 PROCEDURE — 85025 COMPLETE CBC W/AUTO DIFF WBC: CPT

## 2025-02-17 PROCEDURE — 83690 ASSAY OF LIPASE: CPT

## 2025-02-17 PROCEDURE — 84484 ASSAY OF TROPONIN QUANT: CPT | Performed by: STUDENT IN AN ORGANIZED HEALTH CARE EDUCATION/TRAINING PROGRAM

## 2025-02-17 PROCEDURE — 99285 EMERGENCY DEPT VISIT HI MDM: CPT | Performed by: STUDENT IN AN ORGANIZED HEALTH CARE EDUCATION/TRAINING PROGRAM

## 2025-02-17 PROCEDURE — 96375 TX/PRO/DX INJ NEW DRUG ADDON: CPT

## 2025-02-17 PROCEDURE — 80053 COMPREHEN METABOLIC PANEL: CPT

## 2025-02-17 PROCEDURE — 71260 CT THORAX DX C+: CPT

## 2025-02-17 PROCEDURE — 96365 THER/PROPH/DIAG IV INF INIT: CPT

## 2025-02-17 PROCEDURE — 74177 CT ABD & PELVIS W/CONTRAST: CPT

## 2025-02-17 PROCEDURE — 93005 ELECTROCARDIOGRAM TRACING: CPT

## 2025-02-17 PROCEDURE — 36415 COLL VENOUS BLD VENIPUNCTURE: CPT

## 2025-02-17 RX ORDER — ONDANSETRON 2 MG/ML
4 INJECTION INTRAMUSCULAR; INTRAVENOUS ONCE
Status: COMPLETED | OUTPATIENT
Start: 2025-02-17 | End: 2025-02-17

## 2025-02-17 RX ORDER — ACETAMINOPHEN 10 MG/ML
1000 INJECTION, SOLUTION INTRAVENOUS ONCE
Status: COMPLETED | OUTPATIENT
Start: 2025-02-17 | End: 2025-02-17

## 2025-02-17 RX ORDER — MORPHINE SULFATE 4 MG/ML
4 INJECTION, SOLUTION INTRAMUSCULAR; INTRAVENOUS ONCE
Status: DISCONTINUED | OUTPATIENT
Start: 2025-02-17 | End: 2025-02-17

## 2025-02-17 RX ADMIN — ONDANSETRON 4 MG: 2 INJECTION INTRAMUSCULAR; INTRAVENOUS at 20:25

## 2025-02-17 RX ADMIN — SODIUM CHLORIDE 1000 ML: 0.9 INJECTION, SOLUTION INTRAVENOUS at 20:27

## 2025-02-17 RX ADMIN — IOHEXOL 100 ML: 350 INJECTION, SOLUTION INTRAVENOUS at 22:15

## 2025-02-17 RX ADMIN — IOHEXOL 50 ML: 240 INJECTION, SOLUTION INTRATHECAL; INTRAVASCULAR; INTRAVENOUS; ORAL at 22:12

## 2025-02-17 RX ADMIN — ACETAMINOPHEN 1000 MG: 10 INJECTION INTRAVENOUS at 20:58

## 2025-02-18 NOTE — DISCHARGE INSTRUCTIONS
Continue to use over the counter medications as needed for discomfort.     Follow up with your gastroenterologist.     Return for any new or worsening symptoms.

## 2025-02-18 NOTE — ED PROVIDER NOTES
"  ED Disposition       None          Assessment & Plan   {Hyperlinks  Risk Stratification - NIHSS - HEART SCORE - Fill out sepsis note and make sure you call [phone] if severe or septic shock:[id number]}    Medical Decision Making  Amount and/or Complexity of Data Reviewed  Labs: ordered.  Radiology: ordered.    Risk  Prescription drug management.             Medications   sodium chloride 0.9 % bolus 1,000 mL (0 mL Intravenous Stopped 2/18/25 0012)   ondansetron (ZOFRAN) injection 4 mg (4 mg Intravenous Given 2/17/25 2025)   acetaminophen (Ofirmev) injection 1,000 mg (0 mg Intravenous Stopped 2/17/25 2212)   iohexol (OMNIPAQUE) 350 MG/ML injection (MULTI-DOSE) 100 mL (100 mL Intravenous Given 2/17/25 2215)   iohexol (OMNIPAQUE) 240 MG/ML solution 50 mL (50 mL Oral Given 2/17/25 2212)       ED Risk Strat Scores                            SBIRT 20yo+      Flowsheet Row Most Recent Value   Initial Alcohol Screen: US AUDIT-C     1. How often do you have a drink containing alcohol? 0 Filed at: 02/17/2025 2033   2. How many drinks containing alcohol do you have on a typical day you are drinking?  0 Filed at: 02/17/2025 2033   3b. FEMALE Any Age, or MALE 65+: How often do you have 4 or more drinks on one occassion? 0 Filed at: 02/17/2025 2033   Audit-C Score 0 Filed at: 02/17/2025 2033   CALLIE: How many times in the past year have you...    Used an illegal drug or used a prescription medication for non-medical reasons? Never Filed at: 02/17/2025 2033                            History of Present Illness   {Hyperlinks  History (Med, Surg, Fam, Social) - Current Medications - Allergies  :[id number]}    Chief Complaint   Patient presents with    Abdominal Pain     Known hiatal hernia, increased pain at site of hernia, has sonogram appointment this coming Thursday to check their hernia but states \"the pain is too bad I cannot wait\".        Past Medical History:   Diagnosis Date    Disease of thyroid gland       Past Surgical " History:   Procedure Laterality Date    APPENDECTOMY      CHOLECYSTECTOMY      OTHER SURGICAL HISTORY      gastric sleeve    SLEEVE GASTROPLASTY        History reviewed. No pertinent family history.   Social History     Tobacco Use    Smoking status: Never    Smokeless tobacco: Never   Vaping Use    Vaping status: Never Used   Substance Use Topics    Alcohol use: Yes    Drug use: No      E-Cigarette/Vaping    E-Cigarette Use Never User       E-Cigarette/Vaping Substances      I have reviewed and agree with the history as documented.     HPI    Patient is a 55-year-old female present emerged department for an epigastric discomfort.  Pain to increase over the last few days.  Patient notes decreased p.o. intake for the last few days.  No noted fevers or chills.  No change in bowel bladder habits.  Pain remains in the epigastric region and does not radiate.  Prior history of gastric sleeve and hiatal hernia repair.    Review of Systems   Constitutional:  Negative for chills and fever.   HENT:  Negative for ear pain and sore throat.    Eyes:  Negative for pain and visual disturbance.   Respiratory:  Negative for cough and shortness of breath.    Cardiovascular:  Positive for chest pain. Negative for palpitations.   Gastrointestinal:  Positive for abdominal pain and nausea. Negative for vomiting.   Genitourinary:  Negative for dysuria and hematuria.   Musculoskeletal:  Negative for arthralgias and back pain.   Skin:  Negative for color change and rash.   Neurological:  Negative for seizures and syncope.   All other systems reviewed and are negative.          Objective   {Hyperlinks  Historical Vitals - Historical Labs - Chart Review/Microbiology - Last Echo - Code Status  :[id number]}    ED Triage Vitals   Temperature Pulse Blood Pressure Respirations SpO2 Patient Position - Orthostatic VS   02/17/25 1706 02/17/25 1706 02/17/25 1706 02/17/25 1706 02/17/25 1706 02/17/25 1706   98 °F (36.7 °C) 66 116/84 18 98 % Sitting       Temp Source Heart Rate Source BP Location FiO2 (%) Pain Score    02/17/25 1706 02/17/25 1706 02/17/25 1706 -- 02/17/25 2025    Temporal Monitor Left arm  10 - Worst Possible Pain      Vitals      Date and Time Temp Pulse SpO2 Resp BP Pain Score FACES Pain Rating User   02/17/25 2227 -- 63 99 % 18 123/73 -- -- CATHY   02/17/25 2025 -- -- -- -- -- 10 - Worst Possible Pain -- hospitals   02/17/25 1706 98 °F (36.7 °C) 66 98 % 18 116/84 -- -- RO            Physical Exam  Vitals and nursing note reviewed.   Constitutional:       General: She is not in acute distress.     Appearance: She is well-developed.   HENT:      Head: Normocephalic and atraumatic.   Eyes:      Conjunctiva/sclera: Conjunctivae normal.   Cardiovascular:      Rate and Rhythm: Normal rate and regular rhythm.      Heart sounds: No murmur heard.  Pulmonary:      Effort: Pulmonary effort is normal. No respiratory distress.      Breath sounds: Normal breath sounds.   Abdominal:      Palpations: Abdomen is soft.      Tenderness: There is abdominal tenderness in the epigastric area.   Musculoskeletal:         General: No swelling.      Cervical back: Neck supple.   Skin:     General: Skin is warm and dry.      Capillary Refill: Capillary refill takes less than 2 seconds.   Neurological:      Mental Status: She is alert.   Psychiatric:         Mood and Affect: Mood normal.         Results Reviewed       Procedure Component Value Units Date/Time    HS Troponin I 4hr [[id number]]     Lab Status: No result Specimen: Blood     HS Troponin 0hr (reflex protocol) [[id number]]  (Normal) Collected: 02/17/25 2024    Lab Status: Final result Specimen: Blood from Arm, Right Updated: 02/17/25 2056     hs TnI 0hr <2 ng/L     HS Troponin I 2hr [[id number]]     Lab Status: No result Specimen: Blood     Comprehensive metabolic panel [[id number]] Collected: 02/17/25 1711    Lab Status: Final result Specimen: Blood from Arm, Right Updated: 02/17/25 1735     Sodium 138 mmol/L       Potassium 4.1 mmol/L      Chloride 107 mmol/L      CO2 22 mmol/L      ANION GAP 9 mmol/L      BUN 14 mg/dL      Creatinine 0.81 mg/dL      Glucose 92 mg/dL      Calcium 9.4 mg/dL      AST 24 U/L      ALT 22 U/L      Alkaline Phosphatase 70 U/L      Total Protein 7.0 g/dL      Albumin 4.4 g/dL      Total Bilirubin 0.74 mg/dL      eGFR 80 ml/min/1.73sq m     Narrative:      [employer] guidelines for Chronic Kidney Disease (CKD):     Stage 1 with normal or high GFR (GFR > 90 mL/min/1.73 square meters)    Stage 2 Mild CKD (GFR = 60-89 mL/min/1.73 square meters)    Stage 3A Moderate CKD (GFR = 45-59 mL/min/1.73 square meters)    Stage 3B Moderate CKD (GFR = 30-44 mL/min/1.73 square meters)    Stage 4 Severe CKD (GFR = 15-29 mL/min/1.73 square meters)    Stage 5 End Stage CKD (GFR <15 mL/min/1.73 square meters)  Note: GFR calculation is accurate only with a steady state creatinine    Lipase [[id number]]  (Normal) Collected: 02/17/25 1711    Lab Status: Final result Specimen: Blood from Arm, Right Updated: 02/17/25 1735     Lipase 14 u/L     CBC and differential [[id number]] Collected: 02/17/25 1711    Lab Status: Final result Specimen: Blood from Arm, Right Updated: 02/17/25 1716     WBC 7.92 Thousand/uL      RBC 4.14 Million/uL      Hemoglobin 13.6 g/dL      Hematocrit 40.7 %      MCV 98 fL      MCH 32.9 pg      MCHC 33.4 g/dL      RDW 12.7 %      MPV 9.5 fL      Platelets 245 Thousands/uL      nRBC 0 /100 WBCs      Segmented % 59 %      Immature Grans % 0 %      Lymphocytes % 27 %      Monocytes % 9 %      Eosinophils Relative 5 %      Basophils Relative 0 %      Absolute Neutrophils 4.66 Thousands/µL      Absolute Immature Grans 0.03 Thousand/uL      Absolute Lymphocytes 2.14 Thousands/µL      Absolute Monocytes 0.67 Thousand/µL      Eosinophils Absolute 0.39 Thousand/µL      Basophils Absolute 0.03 Thousands/µL             CT chest abdomen pelvis w contrast    (Results Pending)        Procedures    ED Medication and Procedure Management   Prior to Admission Medications   Prescriptions Last Dose Informant Patient Reported? Taking?   ibuprofen (MOTRIN) 600 mg tablet   No No   Sig: Take 1 tablet (600 mg total) by mouth every 6 (six) hours as needed for mild pain for up to 30 doses   morphine (MSIR) 15 mg tablet   No No   Sig: Take 0.5 tablets (7.5 mg total) by mouth every 4 (four) hours as needed for severe pain Max Daily Amount: 45 mg   naproxen (NAPROSYN) 500 mg tablet   No No   Sig: Take 1 tablet (500 mg total) by mouth every 12 (twelve) hours as needed for mild pain or moderate pain      Facility-Administered Medications: None     Patient's Medications   Discharge Prescriptions    No medications on file     No discharge procedures on file.  ED SEPSIS DOCUMENTATION          Medication List as of 2/18/2025  1:09 AM        CONTINUE these medications which have NOT CHANGED    Details   ibuprofen (MOTRIN) 600 mg tablet Take 1 tablet (600 mg total) by mouth every 6 (six) hours as needed for mild pain for up to 30 doses, Starting Tue 9/1/2020, Print      morphine (MSIR) 15 mg tablet Take 0.5 tablets (7.5 mg total) by mouth every 4 (four) hours as needed for severe pain Max Daily Amount: 45 mg, Starting Wed 3/8/2023, Normal      naproxen (NAPROSYN) 500 mg tablet Take 1 tablet (500 mg total) by mouth every 12 (twelve) hours as needed for mild pain or moderate pain, Starting Wed 3/8/2023, Normal           No discharge procedures on file.  ED SEPSIS DOCUMENTATION   Time reflects when diagnosis was documented in both MDM as applicable and the Disposition within this note       Time User Action Codes Description Comment    2/18/2025 12:26 AM Maranda Winston [R10.9] Abdominal pain                  Maranda Winston DO  03/10/25 0049

## 2025-02-21 ENCOUNTER — RESULTS FOLLOW-UP (OUTPATIENT)
Dept: EMERGENCY DEPT | Facility: HOSPITAL | Age: 55
End: 2025-02-21